# Patient Record
Sex: FEMALE | Race: ASIAN | NOT HISPANIC OR LATINO | Employment: PART TIME | URBAN - METROPOLITAN AREA
[De-identification: names, ages, dates, MRNs, and addresses within clinical notes are randomized per-mention and may not be internally consistent; named-entity substitution may affect disease eponyms.]

---

## 2017-06-23 ENCOUNTER — ANESTHESIA - HEALTHEAST (OUTPATIENT)
Dept: OBGYN | Facility: HOSPITAL | Age: 37
End: 2017-06-23

## 2021-06-11 NOTE — ANESTHESIA PREPROCEDURE EVALUATION
Anesthesia Evaluation        Airway   Mallampati: IV  Neck ROM: limited  Comment: Large neck diameter   Pulmonary - normal exam                          Cardiovascular   (+) hypertension, ,     Rhythm: regular  Rate: normal,         Neuro/Psych      Endo/Other    (+) obesity (BMI 38.94), pregnant     GI/Hepatic/Renal       Other findings: Lab Results       Component                Value               Date                       WBC                      8.1                 2017                 HGB                      11.5 (L)            2017                 HCT                      34.5 (L)            2017                 MCV                      79 (L)              2017                 PLT                      167                 2017             Obese      Dental - normal exam                        Anesthesia Plan  Planned anesthetic: epidural   @ term requesting labor analgesia  ASA 2     Anesthetic plan and risks discussed with: patient    Post-op plan: epidural analgesia

## 2021-06-11 NOTE — ANESTHESIA PROCEDURE NOTES
Epidural Block    Patient location during procedure: OB  Time Called: 6/23/2017 8:31 PM  Reason for Block:labor epidural  Staffing:  Performing  Anesthesiologist: OG MOSELEY  Preanesthetic Checklist  Completed: patient identified, risks, benefits, and alternatives discussed, timeout performed, consent obtained, at patient's request, airway assessed, oxygen available, suction available, emergency drugs available and hand hygiene performed  Procedure  Patient position: sitting  Prep: ChloraPrep  Patient monitoring: continuous pulse oximetry, heart rate and blood pressure  Approach: midline  Location: L3-L4  Injection technique: ATUL saline  Number of Attempts:1  Needle  Needle type: CareXtendbrooks   Needle gauge: 18 G     Catheter in Space: 5 (ATUL at 6.5 cm)  Assessment  Sensory level: T10  No complications      Additional Notes:  Patient has a slight curvature to the spine, about 1 cm off of midline palpated at L3-5.  Required on redirection to find epidural space however first attempted level and pass.

## 2021-06-11 NOTE — ANESTHESIA POSTPROCEDURE EVALUATION
Anesthesia type: epidural    Patient location: Labor and Delivery  Last vitals:   Vitals:    06/24/17 0640   BP: 146/69   Pulse: 94   Resp: 18   Temp:    SpO2:      Post vital signs: stable  Level of consciousness: awake, alert and oriented  Post-anesthesia pain: pain controlled  Post-anesthesia nausea and vomiting: no  Pulmonary: unassisted, return to baseline  Cardiovascular: stable and blood pressure at baseline  Hydration: adequate  Anesthetic events: no      Additional Notes:  Good analgesia with labor epidural.  No problems.  No follow up necessary.

## 2021-06-16 PROBLEM — Z34.90 PREGNANT: Status: ACTIVE | Noted: 2017-06-22

## 2024-11-15 ENCOUNTER — TRANSFERRED RECORDS (OUTPATIENT)
Dept: HEALTH INFORMATION MANAGEMENT | Facility: CLINIC | Age: 44
End: 2024-11-15
Payer: COMMERCIAL

## 2024-11-18 ENCOUNTER — MEDICAL CORRESPONDENCE (OUTPATIENT)
Dept: HEALTH INFORMATION MANAGEMENT | Facility: CLINIC | Age: 44
End: 2024-11-18
Payer: COMMERCIAL

## 2024-11-18 ENCOUNTER — TRANSCRIBE ORDERS (OUTPATIENT)
Dept: MATERNAL FETAL MEDICINE | Facility: CLINIC | Age: 44
End: 2024-11-18
Payer: COMMERCIAL

## 2024-11-18 DIAGNOSIS — O26.90 PREGNANCY RELATED CONDITION, ANTEPARTUM: Primary | ICD-10-CM
